# Patient Record
Sex: FEMALE | Race: WHITE | NOT HISPANIC OR LATINO | Employment: FULL TIME | URBAN - METROPOLITAN AREA
[De-identification: names, ages, dates, MRNs, and addresses within clinical notes are randomized per-mention and may not be internally consistent; named-entity substitution may affect disease eponyms.]

---

## 2020-10-10 ENCOUNTER — APPOINTMENT (OUTPATIENT)
Dept: RADIOLOGY | Facility: MEDICAL CENTER | Age: 35
DRG: 293 | End: 2020-10-10
Attending: HOSPITALIST
Payer: COMMERCIAL

## 2020-10-10 ENCOUNTER — APPOINTMENT (OUTPATIENT)
Dept: CARDIOLOGY | Facility: MEDICAL CENTER | Age: 35
DRG: 293 | End: 2020-10-10
Attending: HOSPITALIST
Payer: COMMERCIAL

## 2020-10-10 ENCOUNTER — HOSPITAL ENCOUNTER (INPATIENT)
Facility: MEDICAL CENTER | Age: 35
LOS: 1 days | DRG: 293 | End: 2020-10-11
Attending: EMERGENCY MEDICINE | Admitting: HOSPITALIST
Payer: COMMERCIAL

## 2020-10-10 ENCOUNTER — APPOINTMENT (OUTPATIENT)
Dept: RADIOLOGY | Facility: MEDICAL CENTER | Age: 35
DRG: 293 | End: 2020-10-10
Attending: EMERGENCY MEDICINE
Payer: COMMERCIAL

## 2020-10-10 DIAGNOSIS — I10 HTN (HYPERTENSION), MALIGNANT: ICD-10-CM

## 2020-10-10 DIAGNOSIS — I50.9 ACUTE CONGESTIVE HEART FAILURE, UNSPECIFIED HEART FAILURE TYPE (HCC): ICD-10-CM

## 2020-10-10 DIAGNOSIS — J81.0 ACUTE PULMONARY EDEMA (HCC): ICD-10-CM

## 2020-10-10 PROBLEM — F17.210 TOBACCO DEPENDENCE DUE TO CIGARETTES: Status: ACTIVE | Noted: 2020-10-10

## 2020-10-10 PROBLEM — R94.31 PROLONGED Q-T INTERVAL ON ECG: Status: ACTIVE | Noted: 2020-10-10

## 2020-10-10 LAB
ALBUMIN SERPL BCP-MCNC: 4.1 G/DL (ref 3.2–4.9)
ALBUMIN/GLOB SERPL: 1.5 G/DL
ALP SERPL-CCNC: 67 U/L (ref 30–99)
ALT SERPL-CCNC: 22 U/L (ref 2–50)
ANION GAP SERPL CALC-SCNC: 10 MMOL/L (ref 7–16)
AST SERPL-CCNC: 22 U/L (ref 12–45)
BASOPHILS # BLD AUTO: 0.4 % (ref 0–1.8)
BASOPHILS # BLD: 0.05 K/UL (ref 0–0.12)
BILIRUB SERPL-MCNC: 0.4 MG/DL (ref 0.1–1.5)
BUN SERPL-MCNC: 19 MG/DL (ref 8–22)
CALCIUM SERPL-MCNC: 8.9 MG/DL (ref 8.5–10.5)
CHLORIDE SERPL-SCNC: 103 MMOL/L (ref 96–112)
CO2 SERPL-SCNC: 25 MMOL/L (ref 20–33)
COVID ORDER STATUS COVID19: NORMAL
CREAT SERPL-MCNC: 0.76 MG/DL (ref 0.5–1.4)
D DIMER PPP IA.FEU-MCNC: 1.04 UG/ML (FEU) (ref 0–0.5)
EKG IMPRESSION: NORMAL
EOSINOPHIL # BLD AUTO: 0.2 K/UL (ref 0–0.51)
EOSINOPHIL NFR BLD: 1.4 % (ref 0–6.9)
ERYTHROCYTE [DISTWIDTH] IN BLOOD BY AUTOMATED COUNT: 50.7 FL (ref 35.9–50)
GLOBULIN SER CALC-MCNC: 2.8 G/DL (ref 1.9–3.5)
GLUCOSE SERPL-MCNC: 117 MG/DL (ref 65–99)
HCT VFR BLD AUTO: 40.7 % (ref 37–47)
HGB BLD-MCNC: 12.9 G/DL (ref 12–16)
IMM GRANULOCYTES # BLD AUTO: 0.05 K/UL (ref 0–0.11)
IMM GRANULOCYTES NFR BLD AUTO: 0.4 % (ref 0–0.9)
LV EJECT FRACT  99904: 25
LV EJECT FRACT MOD 2C 99903: 31.97
LV EJECT FRACT MOD 4C 99902: 27.68
LV EJECT FRACT MOD BP 99901: 33.41
LYMPHOCYTES # BLD AUTO: 2.92 K/UL (ref 1–4.8)
LYMPHOCYTES NFR BLD: 20.4 % (ref 22–41)
MCH RBC QN AUTO: 27.3 PG (ref 27–33)
MCHC RBC AUTO-ENTMCNC: 31.7 G/DL (ref 33.6–35)
MCV RBC AUTO: 86 FL (ref 81.4–97.8)
MONOCYTES # BLD AUTO: 0.79 K/UL (ref 0–0.85)
MONOCYTES NFR BLD AUTO: 5.5 % (ref 0–13.4)
NEUTROPHILS # BLD AUTO: 10.27 K/UL (ref 2–7.15)
NEUTROPHILS NFR BLD: 71.9 % (ref 44–72)
NRBC # BLD AUTO: 0 K/UL
NRBC BLD-RTO: 0 /100 WBC
NT-PROBNP SERPL IA-MCNC: 2142 PG/ML (ref 0–125)
PLATELET # BLD AUTO: 410 K/UL (ref 164–446)
PMV BLD AUTO: 8.3 FL (ref 9–12.9)
POTASSIUM SERPL-SCNC: 3.5 MMOL/L (ref 3.6–5.5)
PROT SERPL-MCNC: 6.9 G/DL (ref 6–8.2)
RBC # BLD AUTO: 4.73 M/UL (ref 4.2–5.4)
SARS-COV-2 RNA RESP QL NAA+PROBE: NOTDETECTED
SODIUM SERPL-SCNC: 138 MMOL/L (ref 135–145)
SPECIMEN SOURCE: NORMAL
TROPONIN T SERPL-MCNC: 26 NG/L (ref 6–19)
WBC # BLD AUTO: 14.3 K/UL (ref 4.8–10.8)

## 2020-10-10 PROCEDURE — 700117 HCHG RX CONTRAST REV CODE 255: Performed by: HOSPITALIST

## 2020-10-10 PROCEDURE — 71275 CT ANGIOGRAPHY CHEST: CPT

## 2020-10-10 PROCEDURE — 700102 HCHG RX REV CODE 250 W/ 637 OVERRIDE(OP): Performed by: HOSPITALIST

## 2020-10-10 PROCEDURE — 700102 HCHG RX REV CODE 250 W/ 637 OVERRIDE(OP): Performed by: EMERGENCY MEDICINE

## 2020-10-10 PROCEDURE — 700111 HCHG RX REV CODE 636 W/ 250 OVERRIDE (IP): Performed by: EMERGENCY MEDICINE

## 2020-10-10 PROCEDURE — 93005 ELECTROCARDIOGRAM TRACING: CPT | Performed by: EMERGENCY MEDICINE

## 2020-10-10 PROCEDURE — 36415 COLL VENOUS BLD VENIPUNCTURE: CPT

## 2020-10-10 PROCEDURE — 85025 COMPLETE CBC W/AUTO DIFF WBC: CPT

## 2020-10-10 PROCEDURE — 93306 TTE W/DOPPLER COMPLETE: CPT | Mod: 26 | Performed by: INTERNAL MEDICINE

## 2020-10-10 PROCEDURE — 85379 FIBRIN DEGRADATION QUANT: CPT

## 2020-10-10 PROCEDURE — 700101 HCHG RX REV CODE 250: Performed by: EMERGENCY MEDICINE

## 2020-10-10 PROCEDURE — 80053 COMPREHEN METABOLIC PANEL: CPT

## 2020-10-10 PROCEDURE — 71045 X-RAY EXAM CHEST 1 VIEW: CPT

## 2020-10-10 PROCEDURE — A9270 NON-COVERED ITEM OR SERVICE: HCPCS | Performed by: HOSPITALIST

## 2020-10-10 PROCEDURE — A9270 NON-COVERED ITEM OR SERVICE: HCPCS | Performed by: EMERGENCY MEDICINE

## 2020-10-10 PROCEDURE — 96374 THER/PROPH/DIAG INJ IV PUSH: CPT

## 2020-10-10 PROCEDURE — 99221 1ST HOSP IP/OBS SF/LOW 40: CPT | Performed by: HOSPITALIST

## 2020-10-10 PROCEDURE — 700101 HCHG RX REV CODE 250: Performed by: HOSPITALIST

## 2020-10-10 PROCEDURE — 99255 IP/OBS CONSLTJ NEW/EST HI 80: CPT | Performed by: INTERNAL MEDICINE

## 2020-10-10 PROCEDURE — 93005 ELECTROCARDIOGRAM TRACING: CPT

## 2020-10-10 PROCEDURE — 84484 ASSAY OF TROPONIN QUANT: CPT

## 2020-10-10 PROCEDURE — 770020 HCHG ROOM/CARE - TELE (206)

## 2020-10-10 PROCEDURE — 96375 TX/PRO/DX INJ NEW DRUG ADDON: CPT

## 2020-10-10 PROCEDURE — C9803 HOPD COVID-19 SPEC COLLECT: HCPCS | Performed by: EMERGENCY MEDICINE

## 2020-10-10 PROCEDURE — 83880 ASSAY OF NATRIURETIC PEPTIDE: CPT

## 2020-10-10 PROCEDURE — 700111 HCHG RX REV CODE 636 W/ 250 OVERRIDE (IP): Performed by: HOSPITALIST

## 2020-10-10 PROCEDURE — U0003 INFECTIOUS AGENT DETECTION BY NUCLEIC ACID (DNA OR RNA); SEVERE ACUTE RESPIRATORY SYNDROME CORONAVIRUS 2 (SARS-COV-2) (CORONAVIRUS DISEASE [COVID-19]), AMPLIFIED PROBE TECHNIQUE, MAKING USE OF HIGH THROUGHPUT TECHNOLOGIES AS DESCRIBED BY CMS-2020-01-R: HCPCS

## 2020-10-10 PROCEDURE — 93306 TTE W/DOPPLER COMPLETE: CPT

## 2020-10-10 PROCEDURE — 99285 EMERGENCY DEPT VISIT HI MDM: CPT

## 2020-10-10 RX ORDER — AMOXICILLIN 250 MG
2 CAPSULE ORAL 2 TIMES DAILY
Status: DISCONTINUED | OUTPATIENT
Start: 2020-10-10 | End: 2020-10-11

## 2020-10-10 RX ORDER — LISINOPRIL 5 MG/1
10 TABLET ORAL ONCE
Status: COMPLETED | OUTPATIENT
Start: 2020-10-10 | End: 2020-10-10

## 2020-10-10 RX ORDER — LISINOPRIL 10 MG/1
10 TABLET ORAL DAILY
COMMUNITY
End: 2020-10-10

## 2020-10-10 RX ORDER — AMLODIPINE BESYLATE 5 MG/1
5 TABLET ORAL
Status: DISCONTINUED | OUTPATIENT
Start: 2020-10-10 | End: 2020-10-11 | Stop reason: HOSPADM

## 2020-10-10 RX ORDER — LABETALOL HYDROCHLORIDE 5 MG/ML
20 INJECTION, SOLUTION INTRAVENOUS ONCE
Status: COMPLETED | OUTPATIENT
Start: 2020-10-10 | End: 2020-10-10

## 2020-10-10 RX ORDER — ACETAMINOPHEN 325 MG/1
650 TABLET ORAL EVERY 6 HOURS PRN
Status: DISCONTINUED | OUTPATIENT
Start: 2020-10-10 | End: 2020-10-11 | Stop reason: HOSPADM

## 2020-10-10 RX ORDER — CARVEDILOL 12.5 MG/1
12.5 TABLET ORAL 2 TIMES DAILY WITH MEALS
Status: DISCONTINUED | OUTPATIENT
Start: 2020-10-11 | End: 2020-10-11 | Stop reason: HOSPADM

## 2020-10-10 RX ORDER — LORAZEPAM 2 MG/ML
1 INJECTION INTRAMUSCULAR ONCE
Status: COMPLETED | OUTPATIENT
Start: 2020-10-10 | End: 2020-10-10

## 2020-10-10 RX ORDER — LISINOPRIL 5 MG/1
10 TABLET ORAL
Status: DISCONTINUED | OUTPATIENT
Start: 2020-10-10 | End: 2020-10-10

## 2020-10-10 RX ORDER — ENALAPRILAT 1.25 MG/ML
1.25 INJECTION INTRAVENOUS ONCE
Status: COMPLETED | OUTPATIENT
Start: 2020-10-10 | End: 2020-10-10

## 2020-10-10 RX ORDER — FUROSEMIDE 10 MG/ML
40 INJECTION INTRAMUSCULAR; INTRAVENOUS ONCE
Status: COMPLETED | OUTPATIENT
Start: 2020-10-10 | End: 2020-10-10

## 2020-10-10 RX ORDER — CARVEDILOL 6.25 MG/1
6.25 TABLET ORAL ONCE
Status: DISCONTINUED | OUTPATIENT
Start: 2020-10-10 | End: 2020-10-11 | Stop reason: HOSPADM

## 2020-10-10 RX ORDER — POLYETHYLENE GLYCOL 3350 17 G/17G
1 POWDER, FOR SOLUTION ORAL
Status: DISCONTINUED | OUTPATIENT
Start: 2020-10-10 | End: 2020-10-11 | Stop reason: HOSPADM

## 2020-10-10 RX ORDER — FUROSEMIDE 10 MG/ML
40 INJECTION INTRAMUSCULAR; INTRAVENOUS
Status: DISCONTINUED | OUTPATIENT
Start: 2020-10-10 | End: 2020-10-11 | Stop reason: HOSPADM

## 2020-10-10 RX ORDER — POTASSIUM CHLORIDE 20 MEQ/1
40 TABLET, EXTENDED RELEASE ORAL ONCE
Status: COMPLETED | OUTPATIENT
Start: 2020-10-10 | End: 2020-10-10

## 2020-10-10 RX ORDER — CARVEDILOL 6.25 MG/1
6.25 TABLET ORAL 2 TIMES DAILY WITH MEALS
Status: DISCONTINUED | OUTPATIENT
Start: 2020-10-10 | End: 2020-10-10

## 2020-10-10 RX ORDER — NICOTINE 21 MG/24HR
14 PATCH, TRANSDERMAL 24 HOURS TRANSDERMAL
Status: DISCONTINUED | OUTPATIENT
Start: 2020-10-10 | End: 2020-10-11 | Stop reason: HOSPADM

## 2020-10-10 RX ORDER — LISINOPRIL 20 MG/1
20 TABLET ORAL
Status: DISCONTINUED | OUTPATIENT
Start: 2020-10-11 | End: 2020-10-11 | Stop reason: HOSPADM

## 2020-10-10 RX ORDER — LABETALOL HYDROCHLORIDE 5 MG/ML
10 INJECTION, SOLUTION INTRAVENOUS EVERY 6 HOURS PRN
Status: DISCONTINUED | OUTPATIENT
Start: 2020-10-10 | End: 2020-10-11

## 2020-10-10 RX ORDER — FUROSEMIDE 20 MG/1
20 TABLET ORAL 2 TIMES DAILY
COMMUNITY
End: 2020-10-10

## 2020-10-10 RX ORDER — CARVEDILOL 12.5 MG/1
12.5 TABLET ORAL 2 TIMES DAILY WITH MEALS
COMMUNITY
End: 2020-10-10

## 2020-10-10 RX ORDER — BISACODYL 10 MG
10 SUPPOSITORY, RECTAL RECTAL
Status: DISCONTINUED | OUTPATIENT
Start: 2020-10-10 | End: 2020-10-11 | Stop reason: HOSPADM

## 2020-10-10 RX ORDER — ASPIRIN 81 MG/1
324 TABLET, CHEWABLE ORAL ONCE
Status: COMPLETED | OUTPATIENT
Start: 2020-10-10 | End: 2020-10-10

## 2020-10-10 RX ADMIN — ENALAPRILAT 1.25 MG: 1.25 INJECTION INTRAVENOUS at 12:30

## 2020-10-10 RX ADMIN — CARVEDILOL 6.25 MG: 6.25 TABLET, FILM COATED ORAL at 17:28

## 2020-10-10 RX ADMIN — LORAZEPAM 1 MG: 2 INJECTION INTRAMUSCULAR; INTRAVENOUS at 10:27

## 2020-10-10 RX ADMIN — POTASSIUM CHLORIDE 40 MEQ: 1500 TABLET, EXTENDED RELEASE ORAL at 14:32

## 2020-10-10 RX ADMIN — ENOXAPARIN SODIUM 40 MG: 40 INJECTION SUBCUTANEOUS at 14:33

## 2020-10-10 RX ADMIN — LISINOPRIL 10 MG: 5 TABLET ORAL at 14:32

## 2020-10-10 RX ADMIN — AMLODIPINE BESYLATE 5 MG: 5 TABLET ORAL at 18:22

## 2020-10-10 RX ADMIN — ASPIRIN 324 MG: 81 TABLET, CHEWABLE ORAL at 10:26

## 2020-10-10 RX ADMIN — IOHEXOL 78 ML: 350 INJECTION, SOLUTION INTRAVENOUS at 17:14

## 2020-10-10 RX ADMIN — ENALAPRILAT 1.25 MG: 1.25 INJECTION INTRAVENOUS at 12:06

## 2020-10-10 RX ADMIN — NICOTINE 14 MG: 14 PATCH TRANSDERMAL at 14:32

## 2020-10-10 RX ADMIN — LABETALOL HYDROCHLORIDE 10 MG: 5 INJECTION, SOLUTION INTRAVENOUS at 20:59

## 2020-10-10 RX ADMIN — NITROGLYCERIN 1 INCH: 20 OINTMENT TOPICAL at 12:06

## 2020-10-10 RX ADMIN — FUROSEMIDE 40 MG: 10 INJECTION, SOLUTION INTRAMUSCULAR; INTRAVENOUS at 12:07

## 2020-10-10 RX ADMIN — FUROSEMIDE 40 MG: 10 INJECTION, SOLUTION INTRAMUSCULAR; INTRAVENOUS at 14:36

## 2020-10-10 RX ADMIN — LABETALOL HYDROCHLORIDE 20 MG: 5 INJECTION INTRAVENOUS at 10:24

## 2020-10-10 RX ADMIN — LISINOPRIL 10 MG: 5 TABLET ORAL at 18:22

## 2020-10-10 RX ADMIN — HUMAN ALBUMIN MICROSPHERES AND PERFLUTREN 3 ML: 10; .22 INJECTION, SOLUTION INTRAVENOUS at 16:08

## 2020-10-10 ASSESSMENT — LIFESTYLE VARIABLES
TOTAL SCORE: 0
HAVE PEOPLE ANNOYED YOU BY CRITICIZING YOUR DRINKING: NO
EVER HAD A DRINK FIRST THING IN THE MORNING TO STEADY YOUR NERVES TO GET RID OF A HANGOVER: NO
TOTAL SCORE: 0
CONSUMPTION TOTAL: NEGATIVE
EVER FELT BAD OR GUILTY ABOUT YOUR DRINKING: NO
HOW MANY TIMES IN THE PAST YEAR HAVE YOU HAD 5 OR MORE DRINKS IN A DAY: 0
HAVE YOU EVER FELT YOU SHOULD CUT DOWN ON YOUR DRINKING: NO
ALCOHOL_USE: NO
DOES PATIENT WANT TO STOP DRINKING: NO
TOTAL SCORE: 0
ON A TYPICAL DAY WHEN YOU DRINK ALCOHOL HOW MANY DRINKS DO YOU HAVE: 0
AVERAGE NUMBER OF DAYS PER WEEK YOU HAVE A DRINK CONTAINING ALCOHOL: 0

## 2020-10-10 ASSESSMENT — COGNITIVE AND FUNCTIONAL STATUS - GENERAL
MOBILITY SCORE: 24
DAILY ACTIVITIY SCORE: 24
SUGGESTED CMS G CODE MODIFIER DAILY ACTIVITY: CH
SUGGESTED CMS G CODE MODIFIER MOBILITY: CH

## 2020-10-10 ASSESSMENT — PATIENT HEALTH QUESTIONNAIRE - PHQ9
1. LITTLE INTEREST OR PLEASURE IN DOING THINGS: NOT AT ALL
SUM OF ALL RESPONSES TO PHQ9 QUESTIONS 1 AND 2: 0
2. FEELING DOWN, DEPRESSED, IRRITABLE, OR HOPELESS: NOT AT ALL

## 2020-10-10 ASSESSMENT — PAIN DESCRIPTION - PAIN TYPE: TYPE: ACUTE PAIN

## 2020-10-10 NOTE — ED NOTES
Med rec updated and complete. Allergies reviewed. Pt reports not taking any medications in > 6 months.   In the past pt has taken  Carvedilol 12.5 mg BID  Furosemide 20 mg BID  Lisinopril 10 mg DAILY.      Pt is from [facility] and does not have a local pharmacy.

## 2020-10-10 NOTE — PROGRESS NOTES
Assumed care of pt, received bedside report from NINA García. Pt transported to T811-2 with all belongings. Pt sitting up in bed, no complaints of pain, room air, A/Ox4. Fall and safety precautions in place, strip alarm in place. Discussed POC with pt, pt verbalizes understanding. No further needs at this time.

## 2020-10-10 NOTE — ED TRIAGE NOTES
"..  Chief Complaint   Patient presents with   • Shortness of Breath     c/o of SOB x's 1 week with chest pressure, fatigue. pt works at long term care facility    hx of CHF and HTN. Pt non compliment with mediation       .BP (!) 223/145   Pulse (!) 104   Temp 36.6 °C (97.9 °F) (Temporal)   Resp (!) 25   Ht 1.676 m (5' 6\")   Wt 93.7 kg (206 lb 9.1 oz)   SpO2 93%        Pt roomed   "

## 2020-10-10 NOTE — CARE PLAN
Problem: Communication  Goal: The ability to communicate needs accurately and effectively will improve  Outcome: PROGRESSING AS EXPECTED     Problem: Safety  Goal: Will remain free from injury  Outcome: PROGRESSING AS EXPECTED     Problem: Infection  Goal: Will remain free from infection  Outcome: PROGRESSING AS EXPECTED     Problem: Bowel/Gastric:  Goal: Normal bowel function is maintained or improved  Outcome: PROGRESSING AS EXPECTED     Problem: Knowledge Deficit  Goal: Knowledge of disease process/condition, treatment plan, diagnostic tests, and medications will improve  Outcome: PROGRESSING AS EXPECTED

## 2020-10-10 NOTE — PROGRESS NOTES
Spiritual Care Note    Patient Information     Patient's Name: Tammi Durand   MRN: [id number]    YOB: 1985   Age and Gender: 35 y.o. female   Service Area: Telemetry   Room (and Bed): [place] [address]   Ethnicity or Nationality:     Primary Language: English   Mu-ism/Spiritual preference: Nothing in Particular   Place of Residence: [place]   Family/Friends/Others Present: No   Clinical Team Present: No   Medical Diagnosis(-es)/Procedure(s): CHF   Code Status: Full Code    Date of Admission: 10/10/2020   Length of Stay: 0 days        Spiritual Care Provider Information:  Name of Spiritual Care Provider: Cecelia Lake  Title of Spiritual Care Provider: Associate Le  Phone Number: [phone]  E-mail: [email or web address]  Total time : 5 minutes    Spiritual Screen Results:    Gen Nursing  Spiritual Screen  Was spiritual care education provided to the patient?: Declined     Palliative Care  PC Mu-ism/Spiritual Screening  Was spiritual care education provided to the patient?: Declined      Encounter/Request Information  Encounter/Request Type   Visited With: Patient  Nature of the Visit: Initial, On shift  General Visit: Yes  Referral From/ Origin of Request: Epic nursing    Religous Needs/Values       Spiritual Assessment     Spiritual Care Encounters    Interaction/Conversation: Pt politely declined visit.    Plan: Visit Upon Request    Notes:

## 2020-10-10 NOTE — ED PROVIDER NOTES
ED Provider Note    Scribed for Samm Rae M.D. by Rashida Raphael. 10/10/2020  9:57 AM    Primary care provider: No primary care provider noted  Means of arrival: walk in  History obtained from: Patient  History limited by: None    CHIEF COMPLAINT  Chief Complaint   Patient presents with   • Shortness of Breath     c/o of SOB x's 1 week with chest pressure, fatigue. pt works at long term care facility    PPE Note: I personally donned appropriate PPE for all patient encounters during this visit, including wearing an N95 respirator mask, gloves, and eye protection. Scribe remained outside the patient's room and did not have any contact with the patient for the duration of patient encounter.      HPI  Tammi Durand is a 35 y.o. female who presents to the Emergency Department with intermittent moderate shortness of breath onset 1 week ago. 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 She reports additional symptoms of mild chest pressure and fatigue, and denies abdominal pain, headache, diarrhea, or alteration in taste or smell.  She also denies exposure to COVID-19 and states that she is tested for COVID-19 weekly. No exacerbating or alleviating factors were noted at this time.     REVIEW OF SYSTEMS  Pertinent negatives include no abdominal pain, headache, diarrhea, or alteration in taste or smell, As above, all other systems reviewed and are negative.   See HPI for further details.     PAST MEDICAL HISTORY   has a past medical history of Congestive heart failure (HCC) and Hypertension.    SURGICAL HISTORY  patient denies any surgical history    SOCIAL HISTORY  Social History     Tobacco Use   • Smoking status: Current Every Day Smoker   Substance Use Topics   • Alcohol use: Not Currently   • Drug use: Not Currently      Social History     Substance and Sexual Activity   Drug Use Not Currently       FAMILY HISTORY  History reviewed. No pertinent family history.    CURRENT MEDICATIONS  Home  "Medications     Reviewed by Sheri De Anda R.N. (Registered Nurse) on 10/10/20 at 0950  Med List Status: Not Addressed   Medication Last Dose Status   carvedilol (COREG) 12.5 MG Tab  Active   furosemide (LASIX) 20 MG Tab  Active   lisinopril (PRINIVIL) 10 MG Tab  Active                ALLERGIES  No Known Allergies    PHYSICAL EXAM  VITAL SIGNS: BP (!) 223/145   Pulse (!) 104   Temp 36.6 °C (97.9 °F) (Temporal)   Resp (!) 25   Ht 1.676 m (5' 6\")   Wt 93.7 kg (206 lb 9.1 oz)   SpO2 93%   BMI 33.34 kg/m²   Constitutional: Appears very uncomfortable ,Well developed, Well nourished, Non-toxic appearance.   HENT: Normocephalic, Atraumatic, Bilateral external ears normal, Oropharynx is clear mucous membranes are moist. No oral exudates or nasal discharge.   Eyes: Pupils are equal round and reactive, EOMI, Conjunctiva normal, No discharge.   Neck: Normal range of motion, No tenderness, Supple, No stridor. No meningismus.  Lymphatic: No lymphadenopathy noted.   Cardiovascular: Regular rate and rhythm without murmur rub or gallop.  Thorax & Lungs: Clear breath sounds bilaterally without wheezes, rhonchi or rales. There is no chest wall tenderness.   Abdomen: Soft non-tender non-distended. There is no rebound or guarding. No organomegaly is appreciated. Bowel sounds are normal.  Skin: Normal without rash.   Back: No CVA or spinal tenderness.   Extremities: Intact distal pulses, No edema, No tenderness, No cyanosis, No clubbing. Capillary refill is less than 2 seconds.  Musculoskeletal: Good range of motion in all major joints. No tenderness to palpation or major deformities noted.   Neurologic: Alert & oriented x 3, Normal motor function, Normal sensory function, No focal deficits noted. Reflexes are normal.  Psychiatric: Affect normal, Judgment normal, Mood normal. There is no suicidal ideation or patient reported hallucinations.       DIAGNOSTIC STUDIES / PROCEDURES    LABS  Labs Reviewed   CBC WITH DIFFERENTIAL - " Abnormal; Notable for the following components:       Result Value    WBC 14.3 (*)     MCHC 31.7 (*)     RDW 50.7 (*)     MPV 8.3 (*)     Lymphocytes 20.40 (*)     Neutrophils (Absolute) 10.27 (*)     All other components within normal limits   COMP METABOLIC PANEL - Abnormal; Notable for the following components:    Potassium 3.5 (*)     Glucose 117 (*)     All other components within normal limits   TROPONIN - Abnormal; Notable for the following components:    Troponin T 26 (*)     All other components within normal limits   PROBRAIN NATRIURETIC PEPTIDE, NT - Abnormal; Notable for the following components:    NT-proBNP 2142 (*)     All other components within normal limits   COVID/SARS COV-2   ESTIMATED GFR      All labs reviewed by me.    EKG Interpretation:  Results for orders placed or performed during the hospital encounter of 10/10/20   EKG (NOW)   Result Value Ref Range    Report       [facility] Emergency [facility].    Test Date:  2020-10-10  Pt Name:    AARON ROBERTSON                 Department: ER  MRN:        [id number]                      Room:  Gender:     Female                       Technician: [id number]  :        1985                   Requested By:ER TRIAGE PROTOCOL  Order #:    [id number]                    Reading MD: BUSTER PATEL MD    Measurements  Intervals                                Axis  Rate:       104                          P:          70  HI:         156                          QRS:        10  QRSD:       96                           T:          173  QT:         380  QTc:        500    Interpretive Statements  SINUS TACHYCARDIA  LEFT ATRIAL ABNORMALITY  LVH WITH SECONDARY REPOLARIZATION ABNORMALITY  PROLONGED QT INTERVAL  No previous ECG available for comparison  Electronically Signed On 10- 11:54:10 PDT by BUSTER PATEL MD           RADIOLOGY  DX-CHEST-PORTABLE (1 VIEW)   Final Result      Cardiac silhouette enlargement and interstitial edema. Pneumonia not  excluded        The radiologist's interpretation of all radiological studies have been reviewed by me.    COURSE & MEDICAL DECISION MAKING  Nursing notes, VS, PMSFHx reviewed in chart.    9:45 AM I reviewed the EKG for this patient prior to seeing, as noted above.  I was concerned about shortness of breath secondary to COVID or heart failure or coronary artery disease.  Less likely pneumonia but did broad work-up    EKG shows sinus tachycardia with left ventricular hypertrophy but no evidence of ischemic changes.  QTC is prolonged at 500    Chest x-ray shows cardiac silhouette enlargement with interstitial edema likely secondary to pulmonary edema from CHF that appears to be new onset likely secondary to untreated hypertension for years    9:57 AM Patient seen and examined at bedside. Ordered for labs and DX-chest to evaluate. Patient was treated with  mg, labetalol 20 mg, and Ativan 1 mg for her symptoms. At this time I noted the patient's blood pressure was extremely high.     10:52 AM I noted that the patient had a BNP over 2000; I ordered Lasix to treat.  I have given her Vasotec and Nitropaste but pressure remains high.  We will give another dose of Vasotec but may need to move to nitro drip    Laboratory evaluation reveals white blood cell count elevated at 14,000 with no significant shift or electrolyte derangements there is no acidosis or liver dysfunction and renal function appears normal.  Troponins unremarkable 26 but BNP is quite elevated at over 2100.    Hospitalist is seen the patient at approximately 12:20 PM.  We will continue to manage her vital signs and she will need echo and continue diuresis and blood pressure control.  Patient understands her need for admission    Please note a critical care time of 30 minutes is spent in the care of this patient outside of procedure time    DISPOSITION:  Patient will be discharged home in guarded condition.    FINAL IMPRESSION  1. Acute congestive heart  failure, unspecified heart failure type (HCC)    2. Acute pulmonary edema (HCC)    3. HTN (hypertension), malignant    Critical care time, 30 minutes   IRashida (Scribdaniela), am scribing for, and in the presence of, Samm Rae M.D..    Electronically signed by: Rashida Raphael (Scribe), 10/10/2020    ISamm M.D. personally performed the services described in this documentation, as scribed by Rashida Raphael in my presence, and it is both accurate and complete.    C    The note accurately reflects work and decisions made by me.  Samm Rae M.D.  10/10/2020  12:27 PM

## 2020-10-10 NOTE — ED NOTES
PT GIVEN MEDICATIONS PER MAR. COVID SWAB OBTAINED AND SENT TO LAB. PT SITTING UP IN BED, WATCHING TV. CALL LIGHT IN REACH. PT DENIES OTHER NEEDS AT THIS TIME. WAITING FOR ADMISSION ROOM.

## 2020-10-10 NOTE — PROGRESS NOTES
[facility] TRIAGE OFFICER ER REPORT  Consult/Admission requested by: Dr Rae  Chief complaint: Shortness of breath, uncontrolled hypertension  Pertinent history/ER Course: 35-year-old female with history of longstanding uncontrolled hypertension, presented with shortness of breath, medically and interstitial edema on chest x-ray, elevated proBNP at 2000, troponin 26.  Being admitted with hypertensive urgency and new onset CHF.  Code Status: Full per ERP, I personally verified with the ERP patient's code status and the ERP has confirmed this with the patient.   Patient meets admission criterion: Yes..  Recommendations given or work up & consultations requested per triage officer: Echo, diuresis  Consultants involved and pertinent input from consultants: none  Admission status: Observation.   Admission order placed: Yes.   Floor requested: tele  Assigned hospitalist: Dr Cota

## 2020-10-10 NOTE — H&P
Hospital Medicine History & Physical Note    Date of Service  10/10/2020    Primary Care Physician  Pcp Pt States None    Consultants  None at this time.     Code Status  Full Code    Chief Complaint  Chief Complaint   Patient presents with   • Shortness of Breath     c/o of SOB x's 1 week with chest pressure, fatigue. pt works at long term care facility        History of Presenting Illness  35 y.o. female who presented 10/10/2020 with dyspnea.  She has been having moderate orthostatic dyspnea gradually worsening over the past week.  It has progressed to the point that she has been unable to sleep in a bed, instead sleeping in recliner's.  She actually has a history of CHF, has been off her medications for about 6 months now secondary to financial constraints.  Drove to [place] last night from her hometown of [place], had a few cocktails with her mom, and felt fairly severely short of breath when she awoke this morning.  She has been hospitalized for this before, about 2 years ago.  Onset is rapid, acuity is acute, severity is severe, location is cardiopulmonary, aggravating and alleviating factors per above.    Review of Systems  All systems reviewed negative except as noted per HPI.    Past Medical History   has a past medical history of Congestive heart failure (HCC) and Hypertension.    Surgical History   has no past surgical history on file.     Family History  Significant for a father with a history of stroke, otherwise noncontributory.  Negative for heart disease.    Social History  Patient smokes perhaps half pack a day, denies any significant EtOH use, occasional THC use.  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  She is here on vacation with her mom and 2 children aged 4 and 11.  She desires to be full code.    Allergies  No Known Allergies    Medications  None       Physical Exam  Temp:  [36.6 °C (97.9 °F)] 36.6 °C (97.9 °F)  Pulse:  [] 85  Resp:  [25-38] 33  BP:  (192-238)/() 192/121  SpO2:  [93 %-95 %] 94 %    General: No acute distress,    HEENT atraumatic, normocephalic, pupils equal round reactive to light.  Jugular venous distention to about 8 cm above the angle of ariana.  Chest: Respirations are unlabored at this time.    Cardiac: Physiologic S1 and S2  Abdomen: Soft, nontender, nondistended, obese  Extremities: Without clubbing, cyanosis.  1+ bilateral lower extremity edema.  Neuro: Cranial nerves II through XII are grossly intact.      Laboratory:  Recent Labs     10/10/20  1000   WBC 14.3*   RBC 4.73   HEMOGLOBIN 12.9   HEMATOCRIT 40.7   MCV 86.0   MCH 27.3   MCHC 31.7*   RDW 50.7*   PLATELETCT 410   MPV 8.3*     Recent Labs     10/10/20  1000   SODIUM 138   POTASSIUM 3.5*   CHLORIDE 103   CO2 25   GLUCOSE 117*   BUN 19   CREATININE 0.76   CALCIUM 8.9     Recent Labs     10/10/20  1000   ALTSGPT 22   ASTSGOT 22   ALKPHOSPHAT 67   TBILIRUBIN 0.4   GLUCOSE 117*         Recent Labs     10/10/20  1000   NTPROBNP 2142*         Recent Labs     10/10/20  1000   TROPONINT 26*       Imaging:  DX-CHEST-PORTABLE (1 VIEW)   Final Result      Cardiac silhouette enlargement and interstitial edema. Pneumonia not excluded      EC-ECHOCARDIOGRAM COMPLETE W/O CONT    (Results Pending)     Cardiology:  1.  Twelve-lead EKG discloses a sinus tachycardia at 104 ventricular beats per minute, no ST segment elevations or deviations concerning for ischemia are seen.  There is evidence of LVH on leads V1, V2, V3 and V6 with associated secondary repolarization abnormalities, axis is normal.   ms.  Of note, read as per my interpretation of images pending the benefit of cardiology at the time of this dictation.    Assessment/Plan:  I anticipate this patient will require at least two midnights for appropriate medical management, necessitating inpatient admission.    CHF (congestive heart failure) (HCC)  Assessment & Plan  Patient will be admitted to the hospital for management  thereof.  She would benefit from diuresis with loop diuretics, afterload reduction with lisinopril, and beta-blockade as tolerated.  I will obtain a transthoracic echocardiogram to better define nature of her heart failure.  I suspect medication nonadherence may be playing a bit of a role here.  Will engage case management to see if this can be addressed going forward.    Prolonged Q-T interval on ECG  Assessment & Plan  Avoid any QT prolonging agents.    Tobacco dependence due to cigarettes  Assessment & Plan  Nicotine replacement patches and gums per my orders.

## 2020-10-10 NOTE — ASSESSMENT & PLAN NOTE
Patient will be admitted to the hospital for management thereof.  She would benefit from diuresis with loop diuretics, afterload reduction with lisinopril, and beta-blockade as tolerated.  I will obtain a transthoracic echocardiogram to better define nature of her heart failure.  I suspect medication nonadherence may be playing a bit of a role here.  Will engage case management to see if this can be addressed going forward.

## 2020-10-11 VITALS
WEIGHT: 206.57 LBS | OXYGEN SATURATION: 97 % | HEIGHT: 66 IN | DIASTOLIC BLOOD PRESSURE: 109 MMHG | HEART RATE: 89 BPM | TEMPERATURE: 97.8 F | RESPIRATION RATE: 18 BRPM | BODY MASS INDEX: 33.2 KG/M2 | SYSTOLIC BLOOD PRESSURE: 169 MMHG

## 2020-10-11 LAB
ANION GAP SERPL CALC-SCNC: 10 MMOL/L (ref 7–16)
BASOPHILS # BLD AUTO: 0.5 % (ref 0–1.8)
BASOPHILS # BLD: 0.06 K/UL (ref 0–0.12)
BUN SERPL-MCNC: 25 MG/DL (ref 8–22)
CALCIUM SERPL-MCNC: 9.3 MG/DL (ref 8.5–10.5)
CHLORIDE SERPL-SCNC: 101 MMOL/L (ref 96–112)
CO2 SERPL-SCNC: 24 MMOL/L (ref 20–33)
CREAT SERPL-MCNC: 0.8 MG/DL (ref 0.5–1.4)
EOSINOPHIL # BLD AUTO: 0.22 K/UL (ref 0–0.51)
EOSINOPHIL NFR BLD: 1.7 % (ref 0–6.9)
ERYTHROCYTE [DISTWIDTH] IN BLOOD BY AUTOMATED COUNT: 51.3 FL (ref 35.9–50)
GLUCOSE SERPL-MCNC: 94 MG/DL (ref 65–99)
HCG UR QL: NEGATIVE
HCT VFR BLD AUTO: 40.2 % (ref 37–47)
HGB BLD-MCNC: 12.6 G/DL (ref 12–16)
IMM GRANULOCYTES # BLD AUTO: 0.05 K/UL (ref 0–0.11)
IMM GRANULOCYTES NFR BLD AUTO: 0.4 % (ref 0–0.9)
LYMPHOCYTES # BLD AUTO: 2.3 K/UL (ref 1–4.8)
LYMPHOCYTES NFR BLD: 17.4 % (ref 22–41)
MAGNESIUM SERPL-MCNC: 2 MG/DL (ref 1.5–2.5)
MCH RBC QN AUTO: 27.2 PG (ref 27–33)
MCHC RBC AUTO-ENTMCNC: 31.3 G/DL (ref 33.6–35)
MCV RBC AUTO: 86.6 FL (ref 81.4–97.8)
MONOCYTES # BLD AUTO: 0.79 K/UL (ref 0–0.85)
MONOCYTES NFR BLD AUTO: 6 % (ref 0–13.4)
NEUTROPHILS # BLD AUTO: 9.8 K/UL (ref 2–7.15)
NEUTROPHILS NFR BLD: 74 % (ref 44–72)
NRBC # BLD AUTO: 0.02 K/UL
NRBC BLD-RTO: 0.2 /100 WBC
NT-PROBNP SERPL IA-MCNC: 1343 PG/ML (ref 0–125)
PLATELET # BLD AUTO: 396 K/UL (ref 164–446)
PMV BLD AUTO: 8.4 FL (ref 9–12.9)
POTASSIUM SERPL-SCNC: 3.9 MMOL/L (ref 3.6–5.5)
RBC # BLD AUTO: 4.64 M/UL (ref 4.2–5.4)
SODIUM SERPL-SCNC: 135 MMOL/L (ref 135–145)
WBC # BLD AUTO: 13.2 K/UL (ref 4.8–10.8)

## 2020-10-11 PROCEDURE — 83880 ASSAY OF NATRIURETIC PEPTIDE: CPT

## 2020-10-11 PROCEDURE — 83735 ASSAY OF MAGNESIUM: CPT

## 2020-10-11 PROCEDURE — A9270 NON-COVERED ITEM OR SERVICE: HCPCS | Performed by: HOSPITALIST

## 2020-10-11 PROCEDURE — 81025 URINE PREGNANCY TEST: CPT

## 2020-10-11 PROCEDURE — 36415 COLL VENOUS BLD VENIPUNCTURE: CPT

## 2020-10-11 PROCEDURE — 700102 HCHG RX REV CODE 250 W/ 637 OVERRIDE(OP): Performed by: HOSPITALIST

## 2020-10-11 PROCEDURE — 85025 COMPLETE CBC W/AUTO DIFF WBC: CPT

## 2020-10-11 PROCEDURE — 99239 HOSP IP/OBS DSCHRG MGMT >30: CPT | Performed by: STUDENT IN AN ORGANIZED HEALTH CARE EDUCATION/TRAINING PROGRAM

## 2020-10-11 PROCEDURE — 80048 BASIC METABOLIC PNL TOTAL CA: CPT

## 2020-10-11 PROCEDURE — 700111 HCHG RX REV CODE 636 W/ 250 OVERRIDE (IP): Performed by: HOSPITALIST

## 2020-10-11 RX ORDER — LISINOPRIL 20 MG/1
20 TABLET ORAL DAILY
Qty: 30 TAB | Refills: 0 | Status: SHIPPED | OUTPATIENT
Start: 2020-10-12

## 2020-10-11 RX ORDER — ACETAMINOPHEN 325 MG/1
500-1000 TABLET ORAL EVERY 6 HOURS PRN
Qty: 30 TAB | Refills: 0 | COMMUNITY
Start: 2020-10-11

## 2020-10-11 RX ORDER — FUROSEMIDE 40 MG/1
40-80 TABLET ORAL 2 TIMES DAILY
Qty: 120 TAB | Refills: 0 | Status: SHIPPED | OUTPATIENT
Start: 2020-10-11

## 2020-10-11 RX ORDER — CARVEDILOL 12.5 MG/1
12.5 TABLET ORAL 2 TIMES DAILY WITH MEALS
Qty: 60 TAB | Refills: 0 | Status: SHIPPED | OUTPATIENT
Start: 2020-10-11

## 2020-10-11 RX ORDER — NICOTINE 21 MG/24HR
PATCH, TRANSDERMAL 24 HOURS TRANSDERMAL
Qty: 30 PATCH | COMMUNITY
Start: 2020-10-12

## 2020-10-11 RX ADMIN — AMLODIPINE BESYLATE 5 MG: 5 TABLET ORAL at 04:33

## 2020-10-11 RX ADMIN — CARVEDILOL 12.5 MG: 12.5 TABLET, FILM COATED ORAL at 07:22

## 2020-10-11 RX ADMIN — LABETALOL HYDROCHLORIDE 10 MG: 5 INJECTION, SOLUTION INTRAVENOUS at 01:21

## 2020-10-11 RX ADMIN — NICOTINE 14 MG: 14 PATCH TRANSDERMAL at 04:35

## 2020-10-11 RX ADMIN — FUROSEMIDE 40 MG: 10 INJECTION, SOLUTION INTRAMUSCULAR; INTRAVENOUS at 04:34

## 2020-10-11 RX ADMIN — LISINOPRIL 20 MG: 20 TABLET ORAL at 04:33

## 2020-10-11 ASSESSMENT — PAIN DESCRIPTION - PAIN TYPE: TYPE: ACUTE PAIN

## 2020-10-11 NOTE — CONSULTS
Cardiology Initial Consult Note    DOS: 10/10/2020    Referring physician: Dr Cota    Chief complaint/Reason for consult: CHF    HPI: 35-year-old female with history of uncontrolled hypertension, systolic heart failure diagnosis in the past, medication noncompliance, presenting with 7 to 10 days of shortness of breath on exertion.  Her symptoms have been worsening each day.  Her symptoms were worse on the day of presentation.  She has shortness of breath with exertion and shortness of breath at rest.  She was found to have significant pulmonary edema.  She was admitted to the hospital and started on IV Lasix.  She was found to be significantly hypertensive.  She was started on oral antihypertensive regimen.  Echocardiogram was performed showing severely depressed ejection fraction.  Cardiology is consulted for assistance in management.  Patient denies chest pain on exertion.  She denies familial history of heart failure.  She does endorse a paternal history of high blood pressure.  She denies syncope or presyncope.  She denies palpitations.  Her main concern is leaving the hospital tomorrow morning, and in addition, she is concerned about the financial considerations of medications long-term.    ROS (+ highlighted in bold):  Constitutional: Fevers/chills/fatigue/weightloss  HEENT: Blurry vision/eye pain/sore throat/hearing loss  Respiratory: Shortness of breath/cough  Cardiovascular: Chest pain/palpitations/edema/orthopnea/syncope  GI: Nausea/vomitting/diarrhea  MSK: Arthralgias/myagias/muscle weakness  Skin: Rash/sores  Neurological: Numbness/tremors/vertigo  Endocrine: Excessive thirst/polyuria/cold intolerance/heat intolerance  Psych: Depression/anxiety    Past Medical History:   Diagnosis Date   • Congestive heart failure (HCC)    • Hypertension        History reviewed. No pertinent surgical history.    Social History     Socioeconomic History   • Marital status: Single     Spouse name: Not on file   • Number of  children: Not on file   • Years of education: Not on file   • Highest education level: Not on file   Occupational History   • Not on file   Social Needs   • Financial resource strain: Not on file   • Food insecurity     Worry: Not on file     Inability: Not on file   • Transportation needs     Medical: Not on file     Non-medical: Not on file   Tobacco Use   • Smoking status: Current Every Day Smoker   Substance and Sexual Activity   • Alcohol use: Not Currently   • Drug use: Not Currently   • Sexual activity: Not on file   Lifestyle   • Physical activity     Days per week: Not on file     Minutes per session: Not on file   • Stress: Not on file   Relationships   • Social connections     Talks on phone: Not on file     Gets together: Not on file     Attends L[employer] day service: Not on file     Active member of club or organization: Not on file     Attends meetings of clubs or organizations: Not on file     Relationship status: Not on file   • Intimate partner violence     Fear of current or ex partner: Not on file     Emotionally abused: Not on file     Physically abused: Not on file     Forced sexual activity: Not on file   Other Topics Concern   • Not on file   Social History Narrative   • Not on file       Family history of father with HTN and CVA    No Known Allergies    Current Facility-Administered Medications   Medication Dose Route Frequency Provider Last Rate Last Dose   • furosemide (LASIX) injection 40 mg  40 mg Intravenous BID DIURETIC Michael Cota M.D.   40 mg at 10/10/20 1436   • senna-docusate (PERICOLACE or SENOKOT S) 8.6-50 MG per tablet 2 Tab  2 Tab Oral BID Michael Cota M.D.        And   • polyethylene glycol/lytes (MIRALAX) PACKET 1 Packet  1 Packet Oral QDAY PRN Michael Cota M.D.        And   • magnesium hydroxide (MILK OF MAGNESIA) suspension 30 mL  30 mL Oral QDAY PRN Michael Cota M.D.        And   • bisacodyl (DULCOLAX) suppository 10 mg  10 mg Rectal QDAY PRN Michael Cota M.D.        • enoxaparin (LOVENOX) inj 40 mg  40 mg Subcutaneous DAILY Michael Cota M.D.   40 mg at 10/10/20 1433   • acetaminophen (TYLENOL) tablet 650 mg  650 mg Oral Q6HRS PRN Michael Cota M.D.       • nicotine (NICODERM) 14 MG/24HR 14 mg  14 mg Transdermal Daily-0600 Michael Cota M.D.   14 mg at 10/10/20 1432    And   • nicotine polacrilex (NICORETTE) 2 MG piece 2 mg  2 mg Oral Q HOUR PRN Michael Cota M.D.       • influenza vaccine quad injection 0.5 mL  0.5 mL Intramuscular Once PRN Michael Cota M.D.       • [START ON 10/11/2020] lisinopril (PRINIVIL) tablet 20 mg  20 mg Oral Q DAY Michael Cota M.D.       • [START ON 10/11/2020] carvedilol (COREG) tablet 12.5 mg  12.5 mg Oral BID WITH MEALS Michael Cota M.D.       • carvedilol (COREG) tablet 6.25 mg  6.25 mg Oral Once Michael Cota M.D.   Stopped at 10/10/20 1800   • amLODIPine (NORVASC) tablet 5 mg  5 mg Oral Q DAY Michael Cota M.D.   5 mg at 10/10/20 1822   • labetalol (NORMODYNE/TRANDATE) injection 10 mg  10 mg Intravenous Q6HRS PRN Michael Cota M.D.           Physical Exam:  Vitals:    10/10/20 1403 10/10/20 1419 10/10/20 1726 10/10/20 1737   BP: (!) 185/118 (!) 192/123 (!) 187/129 (!) 184/118   Pulse: 91 86 93 91   Resp:  16 18 18   Temp:  36.7 °C (98 °F) 36.8 °C (98.2 °F) 37.2 °C (99 °F)   TempSrc:  Temporal Temporal Temporal   SpO2: 94% 94% 96% 92%   Weight:       Height:         General appearance: NAD, conversant   Eyes: anicteric sclerae, moist conjunctivae; no lid-lag; PERRLA  HENT: Atraumatic; oropharynx clear with moist mucous membranes and no mucosal ulcerations; normal hard and soft palate  Neck: Trachea midline; FROM, supple, no thyromegaly or lymphadenopathy  Lungs: Bibasilar crackles  CV: RRR, no MRGs, no JVD   Abdomen: Soft, non-tender; no masses or HSM  Extremities: No peripheral edema or extremity lymphadenopathy  Skin: Normal temperature, turgor and texture; no rash, ulcers or subcutaneous nodules  Psych: Appropriate  affect, alert and oriented to person, place and time    Data:  Lipids:   No results found for: CHOLSTRLTOT, TRIGLYCERIDE, HDL, LDL     BMP:  Lab Results   Component Value Date/Time    SODIUM 138 10/10/2020 1000    POTASSIUM 3.5 (L) 10/10/2020 1000    CHLORIDE 103 10/10/2020 1000    CO2 25 10/10/2020 1000    GLUCOSE 117 (H) 10/10/2020 1000    BUN 19 10/10/2020 1000    CREATININE 0.76 10/10/2020 1000    CALCIUM 8.9 10/10/2020 1000    ANION 10.0 10/10/2020 1000        TSH:   No results found for: TSHULTRASEN     THYROXINE (T4):   No results found for: FREEDIR     CBC:   Lab Results   Component Value Date/Time    WBC 14.3 (H) 10/10/2020 10:00 AM    RBC 4.73 10/10/2020 10:00 AM    HEMOGLOBIN 12.9 10/10/2020 10:00 AM    HEMATOCRIT 40.7 10/10/2020 10:00 AM    MCV 86.0 10/10/2020 10:00 AM    MCH 27.3 10/10/2020 10:00 AM    MCHC 31.7 (L) 10/10/2020 10:00 AM    RDW 50.7 (H) 10/10/2020 10:00 AM    PLATELETCT 410 10/10/2020 10:00 AM    MPV 8.3 (L) 10/10/2020 10:00 AM    NEUTSPOLYS 71.90 10/10/2020 10:00 AM    LYMPHOCYTES 20.40 (L) 10/10/2020 10:00 AM    MONOCYTES 5.50 10/10/2020 10:00 AM    EOSINOPHILS 1.40 10/10/2020 10:00 AM    BASOPHILS 0.40 10/10/2020 10:00 AM    IMMGRAN 0.40 10/10/2020 10:00 AM    NRBC 0.00 10/10/2020 10:00 AM    NEUTS 10.27 (H) 10/10/2020 10:00 AM    LYMPHS 2.92 10/10/2020 10:00 AM    MONOS 0.79 10/10/2020 10:00 AM    EOS 0.20 10/10/2020 10:00 AM    BASO 0.05 10/10/2020 10:00 AM    IMMGRANAB 0.05 10/10/2020 10:00 AM    NRBCAB 0.00 10/10/2020 10:00 AM        CBC w/o DIFF  Lab Results   Component Value Date/Time    WBC 14.3 (H) 10/10/2020 10:00 AM    RBC 4.73 10/10/2020 10:00 AM    HEMOGLOBIN 12.9 10/10/2020 10:00 AM    MCV 86.0 10/10/2020 10:00 AM    MCH 27.3 10/10/2020 10:00 AM    MCHC 31.7 (L) 10/10/2020 10:00 AM    RDW 50.7 (H) 10/10/2020 10:00 AM    MPV 8.3 (L) 10/10/2020 10:00 AM       Prior echo/stress results reviewed: EF 25%, LVH    EKG interpreted by me: SR, LVH, LQT    Impression/Plan:  1. Acute  congestive heart failure, unspecified heart failure type (HCC)     2. Acute pulmonary edema (HCC)     3. HTN (hypertension), malignant       1.  Hypertension, uncontrolled, with end-stage complications  2.  Acute on chronic systolic heart failure exacerbation  3.  Medication noncompliance    - Aggressive uptitration of Carvedilol and Lisinopril for treatment of hypertensive heart failure  - I stressed importance of medication complicance  - Rx outpatient for medications compatible with $4 Rx plan at [facility] vs [facility]  - Will need Rx for outpatient Lasix as well  - Close FU with outpatient cardiology, she lives in [place]  - Consideration of outpatient stress testing  - ICD consideration if no improvement in EF    Thank you for this consult. Cardiology will continue to follow but pt requests discharge tomorrow AM      Rahat Arauz MD  Cardiac Electrophysiology

## 2020-10-11 NOTE — DISCHARGE SUMMARY
Discharge Summary    CHIEF COMPLAINT ON ADMISSION  Chief Complaint   Patient presents with   • Shortness of Breath     c/o of SOB x's 1 week with chest pressure, fatigue. pt works at long term care facility        Reason for Admission  SOB     Admission Date  10/10/2020    CODE STATUS  Full Code    HPI & HOSPITAL COURSE  This is a 35 y.o. Woman with HTN and decompensated HFrEF who presented with worsening dyspnea during a weekend getaway at [facility]. She lives in [place]. She has not been taking medication due to finances and lost her primary care physician. TTE demonstred LVEF of 25%. She was restarted on lisinopril and carvedilol, which she had run out of. She was given furosemide IV. On the morning after discharge, she was unwilling to stay due to the need to get home with her mother and kids to [place]. She was counseled on the risks of cardiac and respiratory decompensation if leaving and the plan for ongoing diuresis and GDMT titration if she were to stay. She demonstrated capacity to leave and was counseled to report to the ED in [place] if she is not improving. She was extensively counseled on BP and HF care with a PCP and cardiologist ASAP. She may seek emergency services and care coordination in [place]. Lisinopril, carvedilol, and furosemide were refilled at her local pharmacy.       Therefore, she is discharged in fair and stable condition against medcial advice.    The patient met 2-midnight criteria for an inpatient stay at the time of discharge.    Discharge Date  10/11/2020    FOLLOW UP ITEMS POST DISCHARGE  1. Decompensated Heart Failure - diuresis and GDMT titration. Most likely the etiology is HTN as she denies prior substance use history. She would benefit from cardiology referral for HF workup given the severity of her systolic dysfunction.  2. Hypertension - titration of medication with GDMT  3. Tobacco use - counseled to quit      DISCHARGE DIAGNOSES  Active Problems:    CHF (congestive heart  failure) (HCC) POA: Yes    Tobacco dependence due to cigarettes POA: Yes    Prolonged Q-T interval on ECG POA: Yes  Resolved Problems:    * No resolved hospital problems. *      FOLLOW UP  No future appointments.  No follow-up provider specified.    MEDICATIONS ON DISCHARGE     Medication List      START taking these medications      Instructions   acetaminophen 325 MG Tabs  Commonly known as: TYLENOL   Take 1.5-3 Tabs by mouth every 6 hours as needed for Mild Pain or Moderate Pain.  Dose: 487.5-975 mg     carvedilol 12.5 MG Tabs  Commonly known as: COREG   Take 1 Tab by mouth 2 times a day, with meals.  Dose: 12.5 mg     furosemide 40 MG Tabs  Commonly known as: LASIX   Doctor's comments: Take 2 if you do not urinate frequently.  Take 1-2 Tabs by mouth 2 Times a Day.  Dose: 40-80 mg     lisinopril 20 MG Tabs  Start taking on: October 12, 2020  Commonly known as: PRINIVIL   Take 1 Tab by mouth every day.  Dose: 20 mg     nicotine 14 MG/24HR Pt24  Start taking on: October 12, 2020  Commonly known as: NICODERM   Apply  to skin as directed.     nicotine polacrilex 2 MG Gum  Commonly known as: NICORETTE   Take 1 Each by mouth every 1 hour as needed for Smoking Cessation (For nicotine urge).  Dose: 2 mg            Allergies  No Known Allergies    DIET  Orders Placed This Encounter   Procedures   • Diet Order Cardiac     Standing Status:   Standing     Number of Occurrences:   1     Order Specific Question:   Diet:     Answer:   Cardiac [6]       ACTIVITY  As tolerated.  Weight bearing as tolerated    CONSULTATIONS  None    PROCEDURES  None    LABORATORY  Lab Results   Component Value Date    SODIUM 135 10/11/2020    POTASSIUM 3.9 10/11/2020    CHLORIDE 101 10/11/2020    CO2 24 10/11/2020    GLUCOSE 94 10/11/2020    BUN 25 (H) 10/11/2020    CREATININE 0.80 10/11/2020        Lab Results   Component Value Date    WBC 13.2 (H) 10/11/2020    HEMOGLOBIN 12.6 10/11/2020    HEMATOCRIT 40.2 10/11/2020    PLATELETCT 396 10/11/2020         Total time of the discharge process exceeds 35 minutes.

## 2020-10-11 NOTE — CARE PLAN
Problem: Communication  Goal: The ability to communicate needs accurately and effectively will improve  Outcome: PROGRESSING AS EXPECTED     Problem: Safety  Goal: Will remain free from injury  Outcome: PROGRESSING AS EXPECTED  Goal: Will remain free from falls  Outcome: PROGRESSING AS EXPECTED     Problem: Knowledge Deficit  Goal: Knowledge of disease process/condition, treatment plan, diagnostic tests, and medications will improve  Outcome: PROGRESSING AS EXPECTED     Problem: Knowledge Deficit  Goal: Knowledge of disease process/condition, treatment plan, diagnostic tests, and medications will improve  Outcome: PROGRESSING AS EXPECTED

## 2020-10-11 NOTE — PROGRESS NOTES
BSSR received from NINA Tamayo. Patient alert and oriented on room air. Patient resting in bed with no complaints of pain. Bed in low, locked position. Call light within reach. Treaded socks on patient. Will CTM.

## 2020-10-11 NOTE — PROGRESS NOTES
2 RN Skin Check    2 RN skin check complete.   Devices in place: NONE.  Skin assessed under devices: N\A.  Confirmed pressure ulcers found on: NA.  New potential pressure ulcers noted on NA. Wound consult placed N/A.  The following interventions in place Pillows and Lotion.    Pt has try skin on bilateral heels.  All bony prominences checked, no signs of breakdown or redness.

## 2020-10-11 NOTE — PROGRESS NOTES
Tammi Minor Ladarius patient has chosen to leave the hospital against medical advice. The attending physician has not discharged the patient. Patient is not a risk to himself or others. I have discussed with the patient the following:  Physician has not determined patient is ready for discharge, Risks and consequences of leaving the hospital too soon and Benefit of continued hospitalization.      Patient instructed on CHF instructions and educated to  medications at preferred [facility] pharmacy.

## 2020-10-11 NOTE — PROGRESS NOTES
Received report from Jermaine WOOD, at pt bedside.  POC discussed. Call light and belongings within reach. Bed locked and in low position. Alarm on and fall precautions in place.

## 2020-10-11 NOTE — DISCHARGE INSTRUCTIONS
Discharge Instructions per El Soto M.D.    You were hospitalized for fluid buildup in your lungs due to heart failure. Your Ejection Fraction was 25%.  You could not stay to complete treatment, so your medications were refilled and you were instructed to go the ED in [place] if you feel worse or are not getting better.    DIET: Low-Sodium    ACTIVITY: As tolerated    DIAGNOSIS: Decompensated heart failure with reduced ejection fraction    Return to ER if your breathing worsens, you faint for any reason, or you are not able to urinate frequently after taking 2 lasix tablets.        Heart Failure, Diagnosis    Heart failure means that your heart is not able to pump blood in the right way. This makes it hard for your body to work well. Heart failure is usually a long-term (chronic) condition. You must take good care of yourself and follow your treatment plan from your doctor.  What are the causes?  This condition may be caused by:  · High blood pressure.  · Build up of cholesterol and fat in the arteries.  · Heart attack. This injures the heart muscle.  · Heart valves that do not open and close properly.  · Damage of the heart muscle. This is also called cardiomyopathy.  · Lung disease.  · Abnormal heart rhythms.  What increases the risk?  The risk of heart failure goes up as a person ages. This condition is also more likely to develop in people who:  · Are overweight.  · Are male.  · Smoke or chew tobacco.  · Abuse alcohol or illegal drugs.  · Have taken medicines that can damage the heart.  · Have diabetes.  · Have abnormal heart rhythms.  · Have thyroid problems.  · Have low blood counts (anemia).  What are the signs or symptoms?  Symptoms of this condition include:  · Shortness of breath.  · Coughing.  · Swelling of the feet, ankles, legs, or belly.  · Losing weight for no reason.  · Trouble breathing.  · Waking from sleep because of the need to sit up and get more air.  · Rapid heartbeat.  · Being very  tired.  · Feeling dizzy, or feeling like you may pass out (faint).  · Having no desire to eat.  · Feeling like you may vomit (nauseous).  · Peeing (urinating) more at night.  · Feeling confused.  How is this treated?       This condition may be treated with:  · Medicines. These can be given to treat blood pressure and to make the heart muscles stronger.  · Changes in your daily life. These may include eating a healthy diet, staying at a healthy body weight, quitting tobacco and illegal drug use, or doing exercises.  · Surgery. Surgery can be done to open blocked valves, or to put devices in the heart, such as pacemakers.  · A donor heart (heart transplant). You will receive a healthy heart from a donor.  Follow these instructions at home:  · Treat other conditions as told by your doctor. These may include high blood pressure, diabetes, thyroid disease, or abnormal heart rhythms.  · Learn as much as you can about heart failure.  · Get support as you need it.  · Keep all follow-up visits as told by your doctor. This is important.  Summary  · Heart failure means that your heart is not able to pump blood in the right way.  · This condition is caused by high blood pressure, heart attack, or damage of the heart muscle.  · Symptoms of this condition include shortness of breath and swelling of the feet, ankles, legs, or belly. You may also feel very tired or feel like you may vomit.  · You may be treated with medicines, surgery, or changes in your daily life.  · Treat other health conditions as told by your doctor.  This information is not intended to replace advice given to you by your health care provider. Make sure you discuss any questions you have with your health care provider.  Document Released: 09/26/2009 Document Revised: 03/06/2020 Document Reviewed: 03/06/2020  Elsevier Patient Education © 2020 [employer]

## 2020-10-11 NOTE — PROGRESS NOTES
Dr. Soto notified that patient is requesting to leave this morning as she has family that is coming to pick her up from [place]. Dr. Soto to come see patient at bedside to discuss POC.